# Patient Record
Sex: MALE | Race: WHITE | NOT HISPANIC OR LATINO | Employment: UNEMPLOYED | URBAN - METROPOLITAN AREA
[De-identification: names, ages, dates, MRNs, and addresses within clinical notes are randomized per-mention and may not be internally consistent; named-entity substitution may affect disease eponyms.]

---

## 2019-01-01 ENCOUNTER — HOSPITAL ENCOUNTER (INPATIENT)
Facility: HOSPITAL | Age: 59
LOS: 1 days | DRG: 871 | End: 2019-11-14
Attending: EMERGENCY MEDICINE | Admitting: EMERGENCY MEDICINE
Payer: MEDICAID

## 2019-01-01 VITALS
OXYGEN SATURATION: 91 % | WEIGHT: 138.88 LBS | BODY MASS INDEX: 19.88 KG/M2 | TEMPERATURE: 98 F | HEIGHT: 70 IN | SYSTOLIC BLOOD PRESSURE: 116 MMHG | DIASTOLIC BLOOD PRESSURE: 80 MMHG

## 2019-01-01 DIAGNOSIS — Z46.59 ENCOUNTER FOR OROGASTRIC (OG) TUBE PLACEMENT: ICD-10-CM

## 2019-01-01 DIAGNOSIS — A41.9 SEPSIS: ICD-10-CM

## 2019-01-01 DIAGNOSIS — Z51.5 PALLIATIVE CARE ENCOUNTER: ICD-10-CM

## 2019-01-01 LAB
ALBUMIN SERPL BCP-MCNC: 1.7 G/DL (ref 3.5–5.2)
ALBUMIN SERPL BCP-MCNC: 1.8 G/DL (ref 3.5–5.2)
ALBUMIN SERPL BCP-MCNC: 1.8 G/DL (ref 3.5–5.2)
ALLENS TEST: ABNORMAL
ALLENS TEST: ABNORMAL
ALP SERPL-CCNC: 190 U/L (ref 55–135)
ALP SERPL-CCNC: 205 U/L (ref 55–135)
ALP SERPL-CCNC: 438 U/L (ref 55–135)
ALT SERPL W/O P-5'-P-CCNC: 1466 U/L (ref 10–44)
ALT SERPL W/O P-5'-P-CCNC: 498 U/L (ref 10–44)
ALT SERPL W/O P-5'-P-CCNC: 737 U/L (ref 10–44)
ANION GAP SERPL CALC-SCNC: 28 MMOL/L (ref 8–16)
ANION GAP SERPL CALC-SCNC: 31 MMOL/L (ref 8–16)
ANION GAP SERPL CALC-SCNC: 32 MMOL/L (ref 8–16)
ANISOCYTOSIS BLD QL SMEAR: SLIGHT
ANISOCYTOSIS BLD QL SMEAR: SLIGHT
APTT BLDCRRT: 89.2 SEC (ref 21–32)
AST SERPL-CCNC: 2509 U/L (ref 10–40)
AST SERPL-CCNC: 3641 U/L (ref 10–40)
AST SERPL-CCNC: 9923 U/L (ref 10–40)
BACTERIA #/AREA URNS AUTO: ABNORMAL /HPF
BASO STIPL BLD QL SMEAR: ABNORMAL
BASOPHILS # BLD AUTO: ABNORMAL K/UL (ref 0–0.2)
BASOPHILS # BLD AUTO: ABNORMAL K/UL (ref 0–0.2)
BASOPHILS NFR BLD: 0 % (ref 0–1.9)
BILIRUB SERPL-MCNC: 2.7 MG/DL (ref 0.1–1)
BILIRUB SERPL-MCNC: 3 MG/DL (ref 0.1–1)
BILIRUB SERPL-MCNC: 4.1 MG/DL (ref 0.1–1)
BILIRUB UR QL STRIP: NEGATIVE
BUN SERPL-MCNC: 64 MG/DL (ref 6–20)
BUN SERPL-MCNC: 64 MG/DL (ref 6–30)
BUN SERPL-MCNC: 67 MG/DL (ref 6–20)
BUN SERPL-MCNC: 69 MG/DL (ref 6–20)
BURR CELLS BLD QL SMEAR: ABNORMAL
BURR CELLS BLD QL SMEAR: ABNORMAL
CALCIUM SERPL-MCNC: 10.1 MG/DL (ref 8.7–10.5)
CALCIUM SERPL-MCNC: 10.2 MG/DL (ref 8.7–10.5)
CALCIUM SERPL-MCNC: 9.1 MG/DL (ref 8.7–10.5)
CAOX CRY UR QL COMP ASSIST: ABNORMAL
CHLORIDE SERPL-SCNC: 102 MMOL/L (ref 95–110)
CHLORIDE SERPL-SCNC: 91 MMOL/L (ref 95–110)
CHLORIDE SERPL-SCNC: 95 MMOL/L (ref 95–110)
CHLORIDE SERPL-SCNC: 97 MMOL/L (ref 95–110)
CLARITY UR REFRACT.AUTO: ABNORMAL
CO2 SERPL-SCNC: 12 MMOL/L (ref 23–29)
CO2 SERPL-SCNC: 7 MMOL/L (ref 23–29)
CO2 SERPL-SCNC: 8 MMOL/L (ref 23–29)
COLOR UR AUTO: ABNORMAL
CREAT SERPL-MCNC: 3.2 MG/DL (ref 0.5–1.4)
CREAT SERPL-MCNC: 3.2 MG/DL (ref 0.5–1.4)
CREAT SERPL-MCNC: 3.9 MG/DL (ref 0.5–1.4)
CREAT SERPL-MCNC: ABNORMAL MG/DL
D DIMER PPP IA.FEU-MCNC: >33 MG/L FEU
DELSYS: ABNORMAL
DELSYS: ABNORMAL
DIFFERENTIAL METHOD: ABNORMAL
EOSINOPHIL # BLD AUTO: ABNORMAL K/UL (ref 0–0.5)
EOSINOPHIL # BLD AUTO: ABNORMAL K/UL (ref 0–0.5)
EOSINOPHIL NFR BLD: 0 % (ref 0–8)
EOSINOPHIL NFR BLD: 3 % (ref 0–8)
EOSINOPHIL NFR BLD: 3 % (ref 0–8)
ERYTHROCYTE [DISTWIDTH] IN BLOOD BY AUTOMATED COUNT: 14.5 % (ref 11.5–14.5)
ERYTHROCYTE [DISTWIDTH] IN BLOOD BY AUTOMATED COUNT: 14.6 % (ref 11.5–14.5)
ERYTHROCYTE [DISTWIDTH] IN BLOOD BY AUTOMATED COUNT: 14.7 % (ref 11.5–14.5)
ERYTHROCYTE [SEDIMENTATION RATE] IN BLOOD BY WESTERGREN METHOD: 16 MM/H
EST. GFR  (AFRICAN AMERICAN): 18.4 ML/MIN/1.73 M^2
EST. GFR  (AFRICAN AMERICAN): 23.4 ML/MIN/1.73 M^2
EST. GFR  (AFRICAN AMERICAN): 23.4 ML/MIN/1.73 M^2
EST. GFR  (NON AFRICAN AMERICAN): 15.9 ML/MIN/1.73 M^2
EST. GFR  (NON AFRICAN AMERICAN): 20.2 ML/MIN/1.73 M^2
EST. GFR  (NON AFRICAN AMERICAN): 20.2 ML/MIN/1.73 M^2
FIBRINOGEN PPP-MCNC: <70 MG/DL (ref 182–366)
FIO2: 60
FIO2: 60
GLUCOSE SERPL-MCNC: 204 MG/DL (ref 70–110)
GLUCOSE SERPL-MCNC: 223 MG/DL (ref 70–110)
GLUCOSE SERPL-MCNC: 77 MG/DL (ref 70–110)
GLUCOSE SERPL-MCNC: 82 MG/DL (ref 70–110)
GLUCOSE UR QL STRIP: NEGATIVE
HAPTOGLOB SERPL-MCNC: 56 MG/DL (ref 30–250)
HCO3 UR-SCNC: 13.5 MMOL/L (ref 24–28)
HCO3 UR-SCNC: 9 MMOL/L (ref 24–28)
HCT VFR BLD AUTO: 39.2 % (ref 40–54)
HCT VFR BLD AUTO: 41.1 % (ref 40–54)
HCT VFR BLD AUTO: 41.4 % (ref 40–54)
HCT VFR BLD CALC: 39 %PCV (ref 36–54)
HGB BLD-MCNC: 12.4 G/DL (ref 14–18)
HGB BLD-MCNC: 12.5 G/DL (ref 14–18)
HGB BLD-MCNC: 12.5 G/DL (ref 14–18)
HGB UR QL STRIP: ABNORMAL
HYALINE CASTS UR QL AUTO: 29 /LPF
IMM GRANULOCYTES # BLD AUTO: ABNORMAL K/UL (ref 0–0.04)
IMM GRANULOCYTES NFR BLD AUTO: ABNORMAL % (ref 0–0.5)
INR PPP: 3.2 (ref 0.8–1.2)
KETONES UR QL STRIP: NEGATIVE
LACTATE SERPL-SCNC: >12 MMOL/L (ref 0.5–2.2)
LACTATE SERPL-SCNC: >12 MMOL/L (ref 0.5–2.2)
LDH SERPL L TO P-CCNC: 6388 U/L (ref 110–260)
LEUKOCYTE ESTERASE UR QL STRIP: NEGATIVE
LYMPHOCYTES # BLD AUTO: ABNORMAL K/UL (ref 1–4.8)
LYMPHOCYTES # BLD AUTO: ABNORMAL K/UL (ref 1–4.8)
LYMPHOCYTES NFR BLD: 2.5 % (ref 18–48)
LYMPHOCYTES NFR BLD: 3 % (ref 18–48)
LYMPHOCYTES NFR BLD: 4.5 % (ref 18–48)
MAGNESIUM SERPL-MCNC: 3.6 MG/DL (ref 1.6–2.6)
MAGNESIUM SERPL-MCNC: 3.7 MG/DL (ref 1.6–2.6)
MCH RBC QN AUTO: 32.1 PG (ref 27–31)
MCH RBC QN AUTO: 32.4 PG (ref 27–31)
MCH RBC QN AUTO: 33.1 PG (ref 27–31)
MCHC RBC AUTO-ENTMCNC: 30.2 G/DL (ref 32–36)
MCHC RBC AUTO-ENTMCNC: 30.2 G/DL (ref 32–36)
MCHC RBC AUTO-ENTMCNC: 31.9 G/DL (ref 32–36)
MCV RBC AUTO: 104 FL (ref 82–98)
MCV RBC AUTO: 106 FL (ref 82–98)
MCV RBC AUTO: 107 FL (ref 82–98)
METAMYELOCYTES NFR BLD MANUAL: 1.5 %
METAMYELOCYTES NFR BLD MANUAL: 4.5 %
MICROSCOPIC COMMENT: ABNORMAL
MIN VOL: 19
MIN VOL: 8.87
MODE: ABNORMAL
MODE: ABNORMAL
MONOCYTES # BLD AUTO: ABNORMAL K/UL (ref 0.3–1)
MONOCYTES # BLD AUTO: ABNORMAL K/UL (ref 0.3–1)
MONOCYTES NFR BLD: 1 % (ref 4–15)
MONOCYTES NFR BLD: 11 % (ref 4–15)
MONOCYTES NFR BLD: 6 % (ref 4–15)
MYELOCYTES NFR BLD MANUAL: 0.5 %
MYELOCYTES NFR BLD MANUAL: 1.5 %
MYELOCYTES NFR BLD MANUAL: 2 %
NEUTROPHILS NFR BLD: 70 % (ref 38–73)
NEUTROPHILS NFR BLD: 78 % (ref 38–73)
NEUTROPHILS NFR BLD: 94 % (ref 38–73)
NEUTS BAND NFR BLD MANUAL: 6.5 %
NEUTS BAND NFR BLD MANUAL: 7.5 %
NITRITE UR QL STRIP: NEGATIVE
NRBC BLD-RTO: 0 /100 WBC
NRBC BLD-RTO: 0 /100 WBC
NRBC BLD-RTO: 2 /100 WBC
PCO2 BLDA: 31 MMHG (ref 35–45)
PCO2 BLDA: 42.7 MMHG (ref 35–45)
PEEP: 5
PEEP: 5
PH SMN: 6.93 [PH] (ref 7.35–7.45)
PH SMN: 7.25 [PH] (ref 7.35–7.45)
PH UR STRIP: 5 [PH] (ref 5–8)
PHOSPHATE SERPL-MCNC: 14.8 MG/DL (ref 2.7–4.5)
PIP: 21
PLATELET # BLD AUTO: 129 K/UL (ref 150–350)
PLATELET # BLD AUTO: 145 K/UL (ref 150–350)
PLATELET # BLD AUTO: 87 K/UL (ref 150–350)
PLATELET BLD QL SMEAR: ABNORMAL
PMV BLD AUTO: 10.1 FL (ref 9.2–12.9)
PMV BLD AUTO: 10.3 FL (ref 9.2–12.9)
PMV BLD AUTO: 9.8 FL (ref 9.2–12.9)
PO2 BLDA: 105 MMHG (ref 80–100)
PO2 BLDA: 67 MMHG (ref 80–100)
POC BE: -14 MMOL/L
POC BE: -23 MMOL/L
POC IONIZED CALCIUM: 1.13 MMOL/L (ref 1.06–1.42)
POC SATURATED O2: 90 % (ref 95–100)
POC SATURATED O2: 93 % (ref 95–100)
POC TCO2 (MEASURED): 12 MMOL/L (ref 23–29)
POC TCO2: 10 MMOL/L (ref 23–27)
POC TCO2: 14 MMOL/L (ref 23–27)
POCT GLUCOSE: 229 MG/DL (ref 70–110)
POCT GLUCOSE: 244 MG/DL (ref 70–110)
POCT GLUCOSE: 245 MG/DL (ref 70–110)
POCT GLUCOSE: 260 MG/DL (ref 70–110)
POCT GLUCOSE: 263 MG/DL (ref 70–110)
POCT GLUCOSE: 85 MG/DL (ref 70–110)
POIKILOCYTOSIS BLD QL SMEAR: ABNORMAL
POIKILOCYTOSIS BLD QL SMEAR: SLIGHT
POLYCHROMASIA BLD QL SMEAR: ABNORMAL
POLYCHROMASIA BLD QL SMEAR: ABNORMAL
POTASSIUM BLD-SCNC: 6.3 MMOL/L (ref 3.5–5.1)
POTASSIUM SERPL-SCNC: 6.6 MMOL/L (ref 3.5–5.1)
POTASSIUM SERPL-SCNC: 6.6 MMOL/L (ref 3.5–5.1)
POTASSIUM SERPL-SCNC: 8.1 MMOL/L (ref 3.5–5.1)
PROT SERPL-MCNC: 5.4 G/DL (ref 6–8.4)
PROT SERPL-MCNC: 5.5 G/DL (ref 6–8.4)
PROT SERPL-MCNC: 5.7 G/DL (ref 6–8.4)
PROT UR QL STRIP: ABNORMAL
PROTHROMBIN TIME: 31.1 SEC (ref 9–12.5)
PS: 10
RBC # BLD AUTO: 3.78 M/UL (ref 4.6–6.2)
RBC # BLD AUTO: 3.83 M/UL (ref 4.6–6.2)
RBC # BLD AUTO: 3.89 M/UL (ref 4.6–6.2)
RBC #/AREA URNS AUTO: 15 /HPF (ref 0–4)
SAMPLE: ABNORMAL
SITE: ABNORMAL
SITE: ABNORMAL
SODIUM BLD-SCNC: 133 MMOL/L (ref 136–145)
SODIUM SERPL-SCNC: 131 MMOL/L (ref 136–145)
SODIUM SERPL-SCNC: 133 MMOL/L (ref 136–145)
SODIUM SERPL-SCNC: 137 MMOL/L (ref 136–145)
SP GR UR STRIP: 1.01 (ref 1–1.03)
SPONT RATE: 29
SQUAMOUS #/AREA URNS AUTO: 1 /HPF
TOXIC GRANULES BLD QL SMEAR: PRESENT
URN SPEC COLLECT METH UR: ABNORMAL
VT: 450
WBC # BLD AUTO: 26.61 K/UL (ref 3.9–12.7)
WBC # BLD AUTO: 42.46 K/UL (ref 3.9–12.7)
WBC # BLD AUTO: 44.9 K/UL (ref 3.9–12.7)
WBC #/AREA URNS AUTO: 4 /HPF (ref 0–5)
WBC TOXIC VACUOLES BLD QL SMEAR: PRESENT

## 2019-01-01 PROCEDURE — 83735 ASSAY OF MAGNESIUM: CPT

## 2019-01-01 PROCEDURE — 63600175 PHARM REV CODE 636 W HCPCS: Performed by: STUDENT IN AN ORGANIZED HEALTH CARE EDUCATION/TRAINING PROGRAM

## 2019-01-01 PROCEDURE — 82803 BLOOD GASES ANY COMBINATION: CPT

## 2019-01-01 PROCEDURE — 63600175 PHARM REV CODE 636 W HCPCS: Performed by: INTERNAL MEDICINE

## 2019-01-01 PROCEDURE — 99291 CRITICAL CARE FIRST HOUR: CPT | Mod: 25

## 2019-01-01 PROCEDURE — 99291 CRITICAL CARE FIRST HOUR: CPT | Mod: ,,, | Performed by: EMERGENCY MEDICINE

## 2019-01-01 PROCEDURE — 99291 PR CRITICAL CARE, E/M 30-74 MINUTES: ICD-10-PCS | Mod: ,,, | Performed by: INTERNAL MEDICINE

## 2019-01-01 PROCEDURE — 99291 CRITICAL CARE FIRST HOUR: CPT | Mod: ,,, | Performed by: INTERNAL MEDICINE

## 2019-01-01 PROCEDURE — S0028 INJECTION, FAMOTIDINE, 20 MG: HCPCS | Performed by: INTERNAL MEDICINE

## 2019-01-01 PROCEDURE — 99900035 HC TECH TIME PER 15 MIN (STAT)

## 2019-01-01 PROCEDURE — C1751 CATH, INF, PER/CENT/MIDLINE: HCPCS

## 2019-01-01 PROCEDURE — 87040 BLOOD CULTURE FOR BACTERIA: CPT

## 2019-01-01 PROCEDURE — 83605 ASSAY OF LACTIC ACID: CPT

## 2019-01-01 PROCEDURE — 99233 PR SUBSEQUENT HOSPITAL CARE,LEVL III: ICD-10-PCS | Mod: ,,, | Performed by: INTERNAL MEDICINE

## 2019-01-01 PROCEDURE — 96365 THER/PROPH/DIAG IV INF INIT: CPT

## 2019-01-01 PROCEDURE — 99291 PR CRITICAL CARE, E/M 30-74 MINUTES: ICD-10-PCS | Mod: ,,, | Performed by: EMERGENCY MEDICINE

## 2019-01-01 PROCEDURE — 84100 ASSAY OF PHOSPHORUS: CPT

## 2019-01-01 PROCEDURE — 99233 SBSQ HOSP IP/OBS HIGH 50: CPT | Mod: ,,, | Performed by: INTERNAL MEDICINE

## 2019-01-01 PROCEDURE — 94002 VENT MGMT INPAT INIT DAY: CPT

## 2019-01-01 PROCEDURE — 85027 COMPLETE CBC AUTOMATED: CPT | Mod: 91

## 2019-01-01 PROCEDURE — 85384 FIBRINOGEN ACTIVITY: CPT

## 2019-01-01 PROCEDURE — 94640 AIRWAY INHALATION TREATMENT: CPT

## 2019-01-01 PROCEDURE — 36600 WITHDRAWAL OF ARTERIAL BLOOD: CPT

## 2019-01-01 PROCEDURE — 80053 COMPREHEN METABOLIC PANEL: CPT | Mod: 91

## 2019-01-01 PROCEDURE — 25000003 PHARM REV CODE 250: Performed by: INTERNAL MEDICINE

## 2019-01-01 PROCEDURE — 85379 FIBRIN DEGRADATION QUANT: CPT

## 2019-01-01 PROCEDURE — 27000221 HC OXYGEN, UP TO 24 HOURS

## 2019-01-01 PROCEDURE — 51702 INSERT TEMP BLADDER CATH: CPT

## 2019-01-01 PROCEDURE — 81001 URINALYSIS AUTO W/SCOPE: CPT

## 2019-01-01 PROCEDURE — 25000003 PHARM REV CODE 250: Performed by: STUDENT IN AN ORGANIZED HEALTH CARE EDUCATION/TRAINING PROGRAM

## 2019-01-01 PROCEDURE — 94003 VENT MGMT INPAT SUBQ DAY: CPT

## 2019-01-01 PROCEDURE — 99900026 HC AIRWAY MAINTENANCE (STAT)

## 2019-01-01 PROCEDURE — 85007 BL SMEAR W/DIFF WBC COUNT: CPT | Mod: 91

## 2019-01-01 PROCEDURE — 43752 NASAL/OROGASTRIC W/TUBE PLMT: CPT

## 2019-01-01 PROCEDURE — 82962 GLUCOSE BLOOD TEST: CPT

## 2019-01-01 PROCEDURE — 85730 THROMBOPLASTIN TIME PARTIAL: CPT

## 2019-01-01 PROCEDURE — 85610 PROTHROMBIN TIME: CPT

## 2019-01-01 PROCEDURE — 25000242 PHARM REV CODE 250 ALT 637 W/ HCPCS: Performed by: EMERGENCY MEDICINE

## 2019-01-01 PROCEDURE — 80053 COMPREHEN METABOLIC PANEL: CPT

## 2019-01-01 PROCEDURE — 31500 INSERT EMERGENCY AIRWAY: CPT

## 2019-01-01 PROCEDURE — 20000000 HC ICU ROOM

## 2019-01-01 PROCEDURE — 25000003 PHARM REV CODE 250: Performed by: EMERGENCY MEDICINE

## 2019-01-01 PROCEDURE — 94761 N-INVAS EAR/PLS OXIMETRY MLT: CPT

## 2019-01-01 PROCEDURE — 36620 INSERTION CATHETER ARTERY: CPT

## 2019-01-01 PROCEDURE — 36556 INSERT NON-TUNNEL CV CATH: CPT

## 2019-01-01 PROCEDURE — 83010 ASSAY OF HAPTOGLOBIN QUANT: CPT

## 2019-01-01 PROCEDURE — 96375 TX/PRO/DX INJ NEW DRUG ADDON: CPT

## 2019-01-01 PROCEDURE — 83605 ASSAY OF LACTIC ACID: CPT | Mod: 91

## 2019-01-01 PROCEDURE — 63600175 PHARM REV CODE 636 W HCPCS: Performed by: EMERGENCY MEDICINE

## 2019-01-01 PROCEDURE — 96368 THER/DIAG CONCURRENT INF: CPT

## 2019-01-01 PROCEDURE — 25000003 PHARM REV CODE 250

## 2019-01-01 PROCEDURE — 37799 UNLISTED PX VASCULAR SURGERY: CPT

## 2019-01-01 PROCEDURE — 83735 ASSAY OF MAGNESIUM: CPT | Mod: 91

## 2019-01-01 PROCEDURE — 83615 LACTATE (LD) (LDH) ENZYME: CPT

## 2019-01-01 RX ORDER — MORPHINE SULFATE 2 MG/ML
2 INJECTION, SOLUTION INTRAMUSCULAR; INTRAVENOUS
Status: DISCONTINUED | OUTPATIENT
Start: 2019-01-01 | End: 2019-01-01 | Stop reason: HOSPADM

## 2019-01-01 RX ORDER — DEXTROSE MONOHYDRATE 100 MG/ML
1000 INJECTION, SOLUTION INTRAVENOUS
Status: COMPLETED | OUTPATIENT
Start: 2019-01-01 | End: 2019-01-01

## 2019-01-01 RX ORDER — INSULIN ASPART 100 [IU]/ML
0-5 INJECTION, SOLUTION INTRAVENOUS; SUBCUTANEOUS EVERY 6 HOURS PRN
Status: DISCONTINUED | OUTPATIENT
Start: 2019-01-01 | End: 2019-01-01 | Stop reason: HOSPADM

## 2019-01-01 RX ORDER — CALCIUM CHLORIDE IN 0.9 % NACL 1 G/100 ML
1 INTRAVENOUS SOLUTION, PIGGYBACK (ML) INTRAVENOUS ONCE
Status: COMPLETED | OUTPATIENT
Start: 2019-01-01 | End: 2019-01-01

## 2019-01-01 RX ORDER — VANCOMYCIN HCL IN 5 % DEXTROSE 1G/250ML
1000 PLASTIC BAG, INJECTION (ML) INTRAVENOUS ONCE
Status: COMPLETED | OUTPATIENT
Start: 2019-01-01 | End: 2019-01-01

## 2019-01-01 RX ORDER — FENTANYL CITRATE-0.9 % NACL/PF 10 MCG/ML
PLASTIC BAG, INJECTION (ML) INTRAVENOUS CONTINUOUS
Status: DISCONTINUED | OUTPATIENT
Start: 2019-01-01 | End: 2019-01-01 | Stop reason: HOSPADM

## 2019-01-01 RX ORDER — GLUCAGON 1 MG
1 KIT INJECTION
Status: DISCONTINUED | OUTPATIENT
Start: 2019-01-01 | End: 2019-01-01 | Stop reason: HOSPADM

## 2019-01-01 RX ORDER — DEXMEDETOMIDINE HYDROCHLORIDE 4 UG/ML
0.2 INJECTION, SOLUTION INTRAVENOUS CONTINUOUS
Status: DISCONTINUED | OUTPATIENT
Start: 2019-01-01 | End: 2019-01-01 | Stop reason: HOSPADM

## 2019-01-01 RX ORDER — SODIUM CHLORIDE 0.9 % (FLUSH) 0.9 %
10 SYRINGE (ML) INJECTION
Status: DISCONTINUED | OUTPATIENT
Start: 2019-01-01 | End: 2019-01-01 | Stop reason: HOSPADM

## 2019-01-01 RX ORDER — CALCIUM CHLORIDE INJECTION 100 MG/ML
1 INJECTION, SOLUTION INTRAVENOUS
Status: DISCONTINUED | OUTPATIENT
Start: 2019-01-01 | End: 2019-01-01

## 2019-01-01 RX ORDER — FENTANYL CITRATE 50 UG/ML
25 INJECTION, SOLUTION INTRAMUSCULAR; INTRAVENOUS
Status: DISCONTINUED | OUTPATIENT
Start: 2019-01-01 | End: 2019-01-01 | Stop reason: HOSPADM

## 2019-01-01 RX ORDER — FAMOTIDINE 10 MG/ML
20 INJECTION INTRAVENOUS 2 TIMES DAILY
Status: DISCONTINUED | OUTPATIENT
Start: 2019-01-01 | End: 2019-01-01

## 2019-01-01 RX ORDER — ALBUTEROL SULFATE 2.5 MG/.5ML
10 SOLUTION RESPIRATORY (INHALATION)
Status: COMPLETED | OUTPATIENT
Start: 2019-01-01 | End: 2019-01-01

## 2019-01-01 RX ORDER — CHLORHEXIDINE GLUCONATE ORAL RINSE 1.2 MG/ML
15 SOLUTION DENTAL 2 TIMES DAILY
Status: DISCONTINUED | OUTPATIENT
Start: 2019-01-01 | End: 2019-01-01 | Stop reason: HOSPADM

## 2019-01-01 RX ORDER — NOREPINEPHRINE BITARTRATE/D5W 4MG/250ML
PLASTIC BAG, INJECTION (ML) INTRAVENOUS
Status: COMPLETED
Start: 2019-01-01 | End: 2019-01-01

## 2019-01-01 RX ORDER — NOREPINEPHRINE BITARTRATE/D5W 4MG/250ML
0.05 PLASTIC BAG, INJECTION (ML) INTRAVENOUS CONTINUOUS
Status: DISCONTINUED | OUTPATIENT
Start: 2019-01-01 | End: 2019-01-01

## 2019-01-01 RX ORDER — CALCIUM CHLORIDE INJECTION 100 MG/ML
1 INJECTION, SOLUTION INTRAVENOUS
Status: COMPLETED | OUTPATIENT
Start: 2019-01-01 | End: 2019-01-01

## 2019-01-01 RX ORDER — FAMOTIDINE 10 MG/ML
20 INJECTION INTRAVENOUS DAILY
Status: DISCONTINUED | OUTPATIENT
Start: 2019-01-01 | End: 2019-01-01 | Stop reason: HOSPADM

## 2019-01-01 RX ORDER — DEXMEDETOMIDINE HYDROCHLORIDE 4 UG/ML
INJECTION, SOLUTION INTRAVENOUS
Status: COMPLETED
Start: 2019-01-01 | End: 2019-01-01

## 2019-01-01 RX ORDER — LORAZEPAM 2 MG/ML
1 INJECTION INTRAMUSCULAR
Status: DISCONTINUED | OUTPATIENT
Start: 2019-01-01 | End: 2019-01-01 | Stop reason: HOSPADM

## 2019-01-01 RX ADMIN — MORPHINE SULFATE 2 MG: 2 INJECTION, SOLUTION INTRAMUSCULAR; INTRAVENOUS at 12:11

## 2019-01-01 RX ADMIN — LORAZEPAM 1 MG: 2 INJECTION INTRAMUSCULAR; INTRAVENOUS at 12:11

## 2019-01-01 RX ADMIN — PIPERACILLIN AND TAZOBACTAM 4.5 G: 4; .5 INJECTION, POWDER, FOR SOLUTION INTRAVENOUS at 08:11

## 2019-01-01 RX ADMIN — CALCIUM CHLORIDE 1 G: 100 INJECTION, SOLUTION INTRAVENOUS at 01:11

## 2019-01-01 RX ADMIN — DEXTROSE 1000 ML: 10 SOLUTION INTRAVENOUS at 03:11

## 2019-01-01 RX ADMIN — INSULIN ASPART 3 UNITS: 100 INJECTION, SOLUTION INTRAVENOUS; SUBCUTANEOUS at 06:11

## 2019-01-01 RX ADMIN — INSULIN HUMAN 10 UNITS: 100 INJECTION, SOLUTION PARENTERAL at 03:11

## 2019-01-01 RX ADMIN — FAMOTIDINE 20 MG: 10 INJECTION, SOLUTION INTRAVENOUS at 08:11

## 2019-01-01 RX ADMIN — CHLORHEXIDINE GLUCONATE 0.12% ORAL RINSE 15 ML: 1.2 LIQUID ORAL at 11:11

## 2019-01-01 RX ADMIN — CALCIUM CHLORIDE 1 G: 100 INJECTION, SOLUTION INTRAVENOUS at 03:11

## 2019-01-01 RX ADMIN — VANCOMYCIN HYDROCHLORIDE 1000 MG: 1 INJECTION, POWDER, LYOPHILIZED, FOR SOLUTION INTRAVENOUS at 08:11

## 2019-01-01 RX ADMIN — VASOPRESSIN 0.03 UNITS/MIN: 20 INJECTION INTRAVENOUS at 01:11

## 2019-01-01 RX ADMIN — Medication 50 MCG/HR: at 08:11

## 2019-01-01 RX ADMIN — FENTANYL CITRATE 25 MCG: 50 INJECTION INTRAMUSCULAR; INTRAVENOUS at 06:11

## 2019-01-01 RX ADMIN — DEXMEDETOMIDINE HYDROCHLORIDE 0.2 MCG/KG/HR: 4 INJECTION, SOLUTION INTRAVENOUS at 03:11

## 2019-01-01 RX ADMIN — Medication 1 MCG/KG/MIN: at 02:11

## 2019-01-01 RX ADMIN — SODIUM BICARBONATE: 84 INJECTION, SOLUTION INTRAVENOUS at 03:11

## 2019-01-01 RX ADMIN — DEXTROSE 250 ML: 10 SOLUTION INTRAVENOUS at 01:11

## 2019-01-01 RX ADMIN — SODIUM BICARBONATE: 84 INJECTION, SOLUTION INTRAVENOUS at 04:11

## 2019-01-01 RX ADMIN — INSULIN HUMAN 10 UNITS: 100 INJECTION, SOLUTION PARENTERAL at 02:11

## 2019-01-01 RX ADMIN — Medication 0.5 MCG/KG/MIN: at 01:11

## 2019-01-01 RX ADMIN — Medication 0.6 MCG/KG/MIN: at 05:11

## 2019-01-01 RX ADMIN — DEXMEDETOMIDINE HYDROCHLORIDE 0.2 MCG/KG/HR: 4 INJECTION, SOLUTION INTRAVENOUS at 01:11

## 2019-01-01 RX ADMIN — NOREPINEPHRINE BITARTRATE 0.4 MCG/KG/MIN: 1 INJECTION, SOLUTION, CONCENTRATE INTRAVENOUS at 12:11

## 2019-01-01 RX ADMIN — ALBUTEROL SULFATE 10 MG: 2.5 SOLUTION RESPIRATORY (INHALATION) at 01:11

## 2019-01-01 RX ADMIN — PIPERACILLIN AND TAZOBACTAM 4.5 G: 4; .5 INJECTION, POWDER, FOR SOLUTION INTRAVENOUS at 04:11

## 2019-01-01 RX ADMIN — INSULIN ASPART 2 UNITS: 100 INJECTION, SOLUTION INTRAVENOUS; SUBCUTANEOUS at 01:11

## 2019-01-01 RX ADMIN — HYDROCORTISONE SODIUM SUCCINATE 100 MG: 100 INJECTION, POWDER, FOR SOLUTION INTRAMUSCULAR; INTRAVENOUS at 09:11

## 2019-01-01 RX ADMIN — HYDROCORTISONE SODIUM SUCCINATE 100 MG: 100 INJECTION, POWDER, FOR SOLUTION INTRAMUSCULAR; INTRAVENOUS at 01:11

## 2019-11-13 PROBLEM — E87.20 METABOLIC ACIDOSIS: Status: ACTIVE | Noted: 2019-01-01

## 2019-11-13 PROBLEM — Z51.5 PALLIATIVE CARE ENCOUNTER: Status: ACTIVE | Noted: 2019-01-01

## 2019-11-13 PROBLEM — D68.9 COAGULOPATHY: Status: ACTIVE | Noted: 2019-01-01

## 2019-11-13 PROBLEM — J96.90 RESPIRATORY FAILURE: Status: ACTIVE | Noted: 2019-01-01

## 2019-11-13 PROBLEM — N17.9 ACUTE RENAL FAILURE: Status: ACTIVE | Noted: 2019-01-01

## 2019-11-13 PROBLEM — A41.9 SEPSIS: Status: ACTIVE | Noted: 2019-01-01

## 2019-11-13 PROBLEM — C22.9 LIVER CANCER: Status: ACTIVE | Noted: 2019-01-01

## 2019-11-13 PROBLEM — R57.9 SHOCK: Status: ACTIVE | Noted: 2019-01-01

## 2019-11-13 NOTE — HPI
Alvin Mujica is a 59 y.o.male transferred from [facility] presenting with septic shock. Patient has a history liver cancer with mets to lung, allegedly came to outside facility complaining of abdominal pain and shortness of breath. Patient transferred here after he was intubated, hypotensive.  Patient found to have leukocytosis in the 40s, renal failure, lactic acidosis with a pH of 6.9, and hyperkalemia.  He was given Rocephin, meropenem, and multiple drugs to shift his potassium,and started on bicarbonate infusion.He was sedated on Precedex, and was requiring norepinephrine and vasopresin infusions.  EMS states he continues to be tachycardic with stable blood pressures, 100% while intubated.Upon arrival pt was hypthothermic with Temp as low as 89F. Outside CT A/P notable for moderate size bilateral pleural effusions with innumerable bilateral pulmonary metastatic nodules. Moderate volume ascites. Cirrhosis with extensive periportal and abdominal retroperitoneal adenopathy and with known hepatocellular carcinoma.

## 2019-11-13 NOTE — PROGRESS NOTES
Pharmacokinetic Initial Assessment: IV Vancomycin    Assessment/Plan:    Initiate IV vancomycin with loading dose of 1000 mg once with subsequent doses when random concentrations are less than 20 mcg/mL.    Draw vancomycin random level with morning labs on 11/14. Desired empiric serum concentration is 10 to 20 mcg/mL.    Pharmacy will continue to follow and monitor vancomycin.      Please contact pharmacy at extension [phone] with any questions regarding this assessment.     Thank you for the consult,   Lala Martinez, PharmD, BCCCP             Patient brief summary:  Alvin Mujica is a 59 y.o. male initiated on antimicrobial therapy with IV vancomycin for treatment of suspected sepsis    Drug Allergies:   Review of patient's allergies indicates:  No Known Allergies    Actual Body Weight:   63 kg     Renal Function:   Estimated Creatinine Clearance: 22.4 mL/min (A) (based on SCr of 3.2 mg/dL (H)).    Dialysis Method (if applicable):  N/A    CBC (last 72 hours):  Recent Labs   Lab Result Units 11/13/19  0114 11/13/19  0346   WBC K/uL 44.90* 42.46*   Hemoglobin g/dL 12.5* 12.4*   Hematocrit % 41.4 41.1   Platelets K/uL 145* 129*   Gran% % 78.0* 70.0   Lymph% % 2.5* 4.5*   Mono% % 6.0 11.0   Eosinophil% % 3.0 3.0   Basophil% % 0.0 0.0   Differential Method  Manual Manual       Metabolic Panel (last 72 hours):  Recent Labs   Lab Result Units 11/13/19  0114 11/13/19  0231 11/13/19  0346   Sodium mmol/L 137  --  133*   Potassium mmol/L 6.6*  --  6.6*   Chloride mmol/L 97  --  95   CO2 mmol/L 8*  --  7*   Glucose mg/dL 77  --  204*   Glucose, UA   --  Negative  --    BUN, Bld mg/dL 69*  --  67*   Creatinine mg/dL 3.2*  --  3.2*   Albumin g/dL 1.8*  --  1.8*   Total Bilirubin mg/dL 2.7*  --  3.0*   Alkaline Phosphatase U/L 190*  --  205*   AST U/L 2,509*  --  3,641*   ALT U/L 498*  --  737*   Magnesium mg/dL 3.7*  --  3.6*   Phosphorus mg/dL  --   --  14.8*       Drug levels (last 3 results):  No results for input(s):  VANCOMYCINRA, VANCOMYCINPE, VANCOMYCINTR in the last 72 hours.    Microbiologic Results:  Microbiology Results (last 7 days)     Procedure Component Value Units Date/Time    Blood culture #2 **CANNOT BE ORDERED STAT** [[id number]] Collected:  11/13/19 0230    Order Status:  Sent Specimen:  Blood from Peripheral, Antecubital, Left Updated:  11/13/19 0241    Blood culture #1 **CANNOT BE ORDERED STAT** [[id number]] Collected:  11/13/19 0230    Order Status:  Sent Specimen:  Blood from Peripheral, Forearm, Right Updated:  11/13/19 0240

## 2019-11-13 NOTE — CONSULTS
[facility]  Palliative Medicine  Consult Note    Patient Name: Alvin Mujica  MRN: [id number]  Admission Date: 11/13/2019  Hospital Length of Stay: 0 days  Code Status: DNR   Attending Provider: Maryellen Pollock MD  Consulting Provider: José Miguel Laureano MD  Primary Care Physician: No primary care provider on file.  Principal Problem:<principal problem not specified>    Patient information was obtained from past medical records and ER records.      Consults  Assessment/Plan:     Palliative care encounter  Palliative Care Encounter / Goals of care discussion:     Narrative:   Alvin Mujica is a 59 y.o. male patient with known HCC, Cirrhosis of the liver and history of Hep C that presents with abdominal pain and weakness. He developed rapidly progressing shock in an outside ED, likely septic, required emergent intubation and vasopressor support. Imaging suggests progression of his known HCC, now with bilateral pulmonary metastatic lesions, pleural effusions and ascites an no clear source of the pt. Septic shock.   He was transferred for higher level of care. He is currently critically ill on maximal level of support.    1: Symptoms:   - pain assesment: not appears to be in pain   - dyspnea assessment: appears tachypnic on vent   - anxiety assessment: not apparent   - depression assessment: unable to assess    2: Code Status:   - DNR    3: Psychosocial :   - Marital status: unclear  - Children: has a son who lives in [place]  - POA: not on file  - Sister lives in [place]    3: Medicolegal:    - Advance directive: not on file   - per Dr. Pollock the son is requesting maximal support until he can get to the hospital. He is on his way    4: Support System:  - Spiritual: unclear  - Family: son and sister, otherwise unknown     5: Goals of care Decisions / Recommendations / Plan:   Palliative care introduced:  [facility] in Disease and Illness trajectory  - unable to assess as pt. On the  vent    Prognostication:  - very poor prognosis given advancing likely metastatic HCC. Pt. Appears to have non adherent to medical follow ups per reports and has not undergone any therapy for HCC.   - he has now developed what appears to be widely metastatic disease, likely malignant ascites and pleural effusions  - he has developed profound multi organ failure and septic shock and remains critically ill despite maximal supportive care with vent and two pressors.   - I agree with continuing maximal supportive care as is to allow family time to arrive  - I also agree that ,should the pt. Die as a result of his underlying liver cancer and severe multi organ failure, CPR and resuscitation efforts are not beneficial care and I support DNR.     Goals of Care:  - will discuss once family arrives    Symptom Management:  - appears to have tachypnea and mild distress.   - once goals are discussed we can improve his comfort if so desired by family     Disposition:  -admitted to ICU    6: Follow up plans:    daily    Thank you for your consult. I will follow along with you. Please call [phone] with questions.                 Thank you for your consult. I will follow-up with patient. Please contact us if you have any additional questions.    Subjective:     HPI:   Alvin Mujica is a unfortunate 58 yo male patient who is transferred to our hospital from [facility] for what appears to be septic shock. The pt. Was intubated prior to transport and no family is currently available for questioning so most information is gathered from the medical chart.   Apparently the pt. Presented to the local ED with generalized weakness and abdominal pain. He is reported to have a medical history of hepatitis C, Cirrhosis of the liver and HCC. He apparently had missed appointments for follow up with his outside doctors in the past but was scheduled to present to Oncology office on day of presentation to the ED.   CT of  the abdomen suggests compared to prior exams, new onset bilateral pleural effusions and multiple lesions throughout both lungs that suggest metastatic disease. Moderate volume ascites which is new to prior with known HCC and perihepatic lymphadenopathy not definitely changed from a prior study. In summary it appears to represent new metastatic lesions in the lungs, bilateral pleural effusions and new onset ascites. No acute intestinal obstruction or other causes for his septic shock were identified. Laboratory findings reveal marked elevation of inflammatory markers, markedly abnormal acid base status suggesting metabolic and respiratory acidosis and renal failure along with hyperkalemia.     I am being asked to evaluate the pt. And determine most beneficial care along with assist with goals of care discussions when family arrives.     Hospital Course:  No notes on file    Interval History: pt. Now intubated, on the vent, on two pressors and broad spectrum ABX. MAP at 65 mmHg    No past medical history on file.    No past surgical history on file.    Review of patient's allergies indicates:  No Known Allergies    Medications:  Continuous Infusions:   dexmedetomidine (PRECEDEX) infusion 0.2 mcg/kg/hr (11/13/19 0308)    norepinephrine bitartrate-D5W      sodium bicarbonate drip 200 mL/hr at 11/13/19 0450    vasopressin (PITRESSIN) infusion 0.04 Units/min (11/13/19 0138)     Scheduled Meds:   chlorhexidine  15 mL Mouth/Throat BID    famotidine (PF)  20 mg Intravenous Daily    hydrocortisone sodium succinate  100 mg Intravenous Q8H    piperacillin-tazobactam (ZOSYN) IVPB  4.5 g Intravenous Q8H    vancomycin (VANCOCIN) IVPB  1,000 mg Intravenous Once     PRN Meds:Dextrose 10% Bolus, glucagon (human recombinant), insulin aspart U-100, sodium chloride 0.9%    Family History     None        Tobacco Use    Smoking status: Not on file   Substance and Sexual Activity    Alcohol use: Not on file    Drug use: Not on  file    Sexual activity: Not on file       Review of Systems   Unable to perform ROS: Acuity of condition     Objective:     Vital Signs (Most Recent):  Temp: 96.8 °F (36 °C) (11/13/19 1002)  Pulse: 104 (11/13/19 1002)  Resp: (!) 23 (11/13/19 1002)  BP: 132/69 (11/13/19 0702)  SpO2: (!) 93 % (11/13/19 1002) Vital Signs (24h Range):  Temp:  [89.8 °F (32.1 °C)-96.8 °F (36 °C)] 96.8 °F (36 °C)  Pulse:  [] 104  Resp:  [17-34] 23  SpO2:  [83 %-100 %] 93 %  BP: ()/(28-95) 132/69  Arterial Line BP: (75-83)/(56-62) 77/58     Weight: 63 kg (138 lb 14.2 oz)  Body mass index is 19.93 kg/m².    Review of Symptoms  Symptom Assessment (ESAS 0-10 scale)   ESAS 0 1 2 3 4 5 6 7 8 9 10   Pain              Dyspnea              Anxiety              Nausea              Depression               Anorexia              Fatigue              Insomnia              Restlessness               Agitation              CAM / Delirium __ --  ___+   Constipation     __ --  ___+   Diarrhea           __ --  ___+  Bowel Management Plan (BMP): No    Comments:     Pain Assessment: none apparent    OME in 24 hours:     Performance Status: 10    ECOG Performance Status Grade: 4 - Completely disabled    Physical Exam   Constitutional: He appears well-developed. He appears distressed.   Tachypnea noted, pt. On the vent   Eyes: Scleral icterus is present.   Neck: No JVD present.   Cardiovascular:   Tachycardia, no murmur   Pulmonary/Chest:   Intubated, coarse breath sounds throughout. Mild wheezing on expiration. Bilateral crackles, muffled breath sounds at the bases.    Abdominal:   Distended, soft, fluid wave present, absent bowl sounds.    Musculoskeletal: He exhibits no edema or deformity.   Neurological:   Not sedated, not responding to painful stimuli.    Skin: He is diaphoretic.       Significant Labs: All pertinent labs within the past 24 hours have been reviewed.  CBC:   Recent Labs   Lab 11/13/19  0346   WBC 42.46*   HGB 12.4*   HCT 41.1    *   *     BMP:  Recent Labs   Lab 11/13/19  0346   *   *   K 6.6*   CL 95   CO2 7*   BUN 67*   CREATININE 3.2*   CALCIUM 10.1   MG 3.6*     LFT:  Lab Results   Component Value Date    AST 3,641 (H) 11/13/2019    ALKPHOS 205 (H) 11/13/2019    BILITOT 3.0 (H) 11/13/2019     Albumin:   Albumin   Date Value Ref Range Status   11/13/2019 1.8 (L) 3.5 - 5.2 g/dL Final     Protein:   Total Protein   Date Value Ref Range Status   11/13/2019 5.7 (L) 6.0 - 8.4 g/dL Final     Lactic acid:   Lab Results   Component Value Date    LACTATE >12.0 (HH) 11/13/2019       Significant Imaging: I have reviewed all pertinent imaging results/findings within the past 24 hours.    Advance Care Planning   Advanced Directives::  Living Will: No  LaPOST: No  Do Not Resuscitate Status: No  Medical Power of : No, Son next of kin, who lives in [place].     Decision-Making Capacity: Patient unable to communicate due to disease severity/cognitive impairment       Living Arrangements: Lives alone    Psychosocial/Cultural:  Patient's most important priorities:  unknown    Patient's biggest concerns/fears:      Previous death/end of life care history:      Patient's goals/hopes:      Spiritual:     F- Seema and Belief: unknown    I - Importance:   .  C - Community:     A - Address in Care:       > 50% of 45 min visit spent in chart review, face to face discussion of goals of care,  symptom assessment, coordination of care and emotional support.    José Miguel Laureano MD  Palliative Medicine  [facility]

## 2019-11-13 NOTE — ASSESSMENT & PLAN NOTE
- due to underlying liver disease vs DIC  - F/u hemolysis labs  - consider vit K for elevated INR

## 2019-11-13 NOTE — ED TRIAGE NOTES
Pt. Presents to ED today with [facility] from [facility] with c/o septic shock and respiratory distress. Pt. Presents intubated with a-line, on levo, vaso, epi, bicarb, and precedex gtt. Initially hypotensive. Responds to painful stimuli.

## 2019-11-13 NOTE — ASSESSMENT & PLAN NOTE
- likely 2/2 ischemic ATN  -acidotic, Cr 3.2, low UOP  -nephro consulted for dialysis; appreciate recs. Poor HD candidate given shock on two pressors

## 2019-11-13 NOTE — PLAN OF CARE
CM met with patient at the bedside to discuss D/C POC needs. Patient intubated and unable to verify demographics in the chart are correct. CM name and contact number listed on the patient's white board.  CM provided explanation of discharge plan process. CM left blue folder at the bedside with explanation of qualification for placement and facility resources. Noted patient's son Alan is en-route from [place] from medical record.  Will complete assessment upon arrival of family.    Extended Emergency Contact Information  Primary Emergency Contact: Alan Mujica  Mobile Phone: [phone]  Relation: Son   needed? No  Secondary Emergency Contact: Pebbles Downing  Mobile Phone: [phone]  Relation: Friend        11/13/19 1342   Discharge Assessment   Assessment Type Discharge Planning Assessment   Assessment information obtained from? Medical Record   Prior to hospitilization cognitive status: Alert/Oriented   Current cognitive status: Coma/Sedated/Intubated   Current Functional Status: Completely Dependent   Facility Arrived From: [facility]   Lives With alone   Able to Return to Prior Arrangements no   Is patient able to care for self after discharge? No   Who are your caregiver(s) and their phone number(s)? Alan armando [phone]   Do you have any problems affording any of your prescribed medications? No   Is the patient taking medications as prescribed? yes   Discharge Plan [facility] Inpatient Hospice   Patient/Family in Agreement with Plan unable to assess       Garrett Swartz RN MSN  Critical Care-   Ext. [phone]

## 2019-11-13 NOTE — PROGRESS NOTES
Patient arrived to 6065 intubated with vasopressors infusing. Art line BP 70s/40s with cuff not correlating with BP 90s/50s. Skin intact but mottled. Outside facility singh in place, yellow discharge noted to tip of penis; right femoral triple lumen in place. Critical care notified of patient arrival.

## 2019-11-13 NOTE — ASSESSMENT & PLAN NOTE
- likely hypoxemic resp failure 2/2 metastatic disease and malignant pleural effusions reported on CT  - Intubated and mechanically ventillated  Vent Mode: A/C  Oxygen Concentration (%):  [60] 60  Resp Rate Total:  [31 br/min-34 br/min] 32 br/min  Vt Set:  [450 mL] 450 mL  PEEP/CPAP:  [5 cmH20] 5 cmH20  Pressure Support:  [0 cmH20] 0 cmH20  Mean Airway Pressure:  [10 mjS69-30 cmH20] 10 cmH20   -ABGs

## 2019-11-13 NOTE — CODE/ RAPID DOCUMENTATION
Rapid Response Nurse Chart Check     Chart check completed, abnormal VS noted. Temperature 90 warming blanket on and hypotensive on levophed.   Bedside RN Tamra contacted, no concerns verbalized at this time, instructed to call [phone] for further concerns or assistance.

## 2019-11-13 NOTE — SUBJECTIVE & OBJECTIVE
Interval History: pt. Now intubated, on the vent, on two pressors and broad spectrum ABX. MAP at 65 mmHg    No past medical history on file.    No past surgical history on file.    Review of patient's allergies indicates:  No Known Allergies    Medications:  Continuous Infusions:   dexmedetomidine (PRECEDEX) infusion 0.2 mcg/kg/hr (11/13/19 0303)    norepinephrine bitartrate-D5W      sodium bicarbonate drip 200 mL/hr at 11/13/19 0450    vasopressin (PITRESSIN) infusion 0.04 Units/min (11/13/19 0138)     Scheduled Meds:   chlorhexidine  15 mL Mouth/Throat BID    famotidine (PF)  20 mg Intravenous Daily    hydrocortisone sodium succinate  100 mg Intravenous Q8H    piperacillin-tazobactam (ZOSYN) IVPB  4.5 g Intravenous Q8H    vancomycin (VANCOCIN) IVPB  1,000 mg Intravenous Once     PRN Meds:Dextrose 10% Bolus, glucagon (human recombinant), insulin aspart U-100, sodium chloride 0.9%    Family History     None        Tobacco Use    Smoking status: Not on file   Substance and Sexual Activity    Alcohol use: Not on file    Drug use: Not on file    Sexual activity: Not on file       Review of Systems   Unable to perform ROS: Acuity of condition     Objective:     Vital Signs (Most Recent):  Temp: 96.8 °F (36 °C) (11/13/19 1002)  Pulse: 104 (11/13/19 1002)  Resp: (!) 23 (11/13/19 1002)  BP: 132/69 (11/13/19 0702)  SpO2: (!) 93 % (11/13/19 1002) Vital Signs (24h Range):  Temp:  [89.8 °F (32.1 °C)-96.8 °F (36 °C)] 96.8 °F (36 °C)  Pulse:  [] 104  Resp:  [17-34] 23  SpO2:  [83 %-100 %] 93 %  BP: ()/(28-95) 132/69  Arterial Line BP: (75-83)/(56-62) 77/58     Weight: 63 kg (138 lb 14.2 oz)  Body mass index is 19.93 kg/m².    Review of Symptoms  Symptom Assessment (ESAS 0-10 scale)   ESAS 0 1 2 3 4 5 6 7 8 9 10   Pain              Dyspnea              Anxiety              Nausea              Depression               Anorexia              Fatigue              Insomnia              Restlessness                Agitation              CAM / Delirium __ --  ___+   Constipation     __ --  ___+   Diarrhea           __ --  ___+  Bowel Management Plan (BMP): No    Comments:     Pain Assessment: none apparent    OME in 24 hours:     Performance Status: 10    ECOG Performance Status Grade: 4 - Completely disabled    Physical Exam   Constitutional: He appears well-developed. He appears distressed.   Tachypnea noted, pt. On the vent   Eyes: Scleral icterus is present.   Neck: No JVD present.   Cardiovascular:   Tachycardia, no murmur   Pulmonary/Chest:   Intubated, coarse breath sounds throughout. Mild wheezing on expiration. Bilateral crackles, muffled breath sounds at the bases.    Abdominal:   Distended, soft, fluid wave present, absent bowl sounds.    Musculoskeletal: He exhibits no edema or deformity.   Neurological:   Not sedated, not responding to painful stimuli.    Skin: He is diaphoretic.       Significant Labs: All pertinent labs within the past 24 hours have been reviewed.  CBC:   Recent Labs   Lab 11/13/19  0346   WBC 42.46*   HGB 12.4*   HCT 41.1   *   *     BMP:  Recent Labs   Lab 11/13/19  0346   *   *   K 6.6*   CL 95   CO2 7*   BUN 67*   CREATININE 3.2*   CALCIUM 10.1   MG 3.6*     LFT:  Lab Results   Component Value Date    AST 3,641 (H) 11/13/2019    ALKPHOS 205 (H) 11/13/2019    BILITOT 3.0 (H) 11/13/2019     Albumin:   Albumin   Date Value Ref Range Status   11/13/2019 1.8 (L) 3.5 - 5.2 g/dL Final     Protein:   Total Protein   Date Value Ref Range Status   11/13/2019 5.7 (L) 6.0 - 8.4 g/dL Final     Lactic acid:   Lab Results   Component Value Date    LACTATE >12.0 (HH) 11/13/2019       Significant Imaging: I have reviewed all pertinent imaging results/findings within the past 24 hours.    Advance Care Planning   Advanced Directives::  Living Will: No  LaPOST: No  Do Not Resuscitate Status: No  Medical Power of : No, Son next of kin, who lives in [place].     Decision-Making  Capacity: Patient unable to communicate due to disease severity/cognitive impairment       Living Arrangements: Lives alone    Psychosocial/Cultural:  Patient's most important priorities:  unknown    Patient's biggest concerns/fears:      Previous death/end of life care history:      Patient's goals/hopes:      Spiritual:     F- Seema and Belief: unknown    I - Importance:   .  C - Community:     A - Address in Care:

## 2019-11-13 NOTE — ED PROVIDER NOTES
Source of History:  EMS    Chief complaint:  Transfer (from [facility] for septic shock. +intubated)      HPI:  Alvin Mujica is a 59 y.o. male presenting with septic shock.  Patient has a history liver cancer with mets to lung, allegedly came to outside facility complaining of shortness of breath.  Patient transferred here after was intubated, hypotensive.  Patient found to have leukocytosis in the 40s, renal failure, marked good acidosis with a pH of 6.9, hyperkalemia.  He was given Rocephin, meropenem, multiple drugs to shift his potassium, bicarbonate, was sedated on Precedex, and was requiring norepinephrine and epinephrine infusions.  EMS states he continues to be tachycardic with stable blood pressures, 100% while intubated.    ROS: As per HPI and below:    No other review of systems available due to patient's status    Review of patient's allergies indicates:  No Known Allergies    No current facility-administered medications on file prior to encounter.      No current outpatient medications on file prior to encounter.       PMH:  As per HPI and below:  No past medical history on file.  No past surgical history on file.    Social History     Socioeconomic History    Marital status: Not on file     Spouse name: Not on file    Number of children: Not on file    Years of education: Not on file    Highest education level: Not on file   Occupational History    Not on file   Social Needs    Financial resource strain: Not on file    Food insecurity:     Worry: Not on file     Inability: Not on file    Transportation needs:     Medical: Not on file     Non-medical: Not on file   Tobacco Use    Smoking status: Not on file   Substance and Sexual Activity    Alcohol use: Not on file    Drug use: Not on file    Sexual activity: Not on file   Lifestyle    Physical activity:     Days per week: Not on file     Minutes per session: Not on file    Stress: Not on file   Relationships    Social  connections:     Talks on phone: Not on file     Gets together: Not on file     Attends Judaism service: Not on file     Active member of club or organization: Not on file     Attends meetings of clubs or organizations: Not on file     Relationship status: Not on file   Other Topics Concern    Not on file   Social History Narrative    Not on file       No family history on file.    Physical Exam:    Vitals:    11/13/19 0105   BP: 130/67   Pulse: 96   Resp: (!) 26     Appearance:  Intubated, sign-out extremities  Skin:  Cyanotic in his extremities, normal skin tone in his core  Eyes: No conjunctival injection.  MIKI  ENT:  ET tube in place   Chest: Clear to auscultation bilaterally.  Good air movement.  No wheezes.  No rhonchi.  Cardiovascular:  Art line right radial artery, central venous line right femoral vein. Tachycardic regular rhythm.  No murmurs. No gallops. No rubs.  Abdomen: Soft.  Not distended.  Nontender.  No guarding.  No rebound. No Masses  Musculoskeletal: Good range of motion all joints.  No deformities.  Neck supple.  No meningismus.  Neurologic:  Sedated, intubated  Mental Status:  Sedated, intubated          Laboratory Studies:  Labs Reviewed   CULTURE, BLOOD   CULTURE, BLOOD   CBC W/ AUTO DIFFERENTIAL   COMPREHENSIVE METABOLIC PANEL   MAGNESIUM   LACTIC ACID, PLASMA   URINALYSIS, REFLEX TO URINE CULTURE   POCT GLUCOSE   ISTAT CHEM8       I decided to obtain the old medical records.  Reviewed and summarized the old medical record and it showed none available  I independently interpreted the CXR:  Bilateral patchy airspace disease  I independently interpreted the EKG:  Normal sinus rhythm, tachycardic, no ST changes.    Imaging Results          X-Ray Chest 1 View (In process)                 Medications Given:  Medications   norepinephrine 4 mg in dextrose 5% 250 mL infusion (premix) (titrating) (0.5 mcg/kg/min × 63 kg Intravenous New Bag 11/13/19 0114)   vasopressin (PITRESSIN) 0.2 Units/mL  in dextrose 5 % 100 mL infusion (0.03 Units/min Intravenous New Bag 11/13/19 0118)   dexmedetomidine (PRECEDEX) 400mcg/100mL 0.9% NaCL infusion (0 mcg/kg/hr × 63 kg Intravenous Paused 11/13/19 0123)   calcium chloride 100 mg/mL (10 %) injection 1 g (has no administration in time range)   insulin regular injection 10 Units (has no administration in time range)   dextrose 10 % infusion (has no administration in time range)   albuterol sulfate nebulizer solution 10 mg (has no administration in time range)       Discussed with: ICU    MDM:    59 y.o. male with severe sepsis shock, hypothermia, hypotension, tachycardia.  Unknown source, however patient is severely acidotic, and broad-spectrum antibiotics were given at outside facility.  Patient is currently on Precedex, no epinephrine, vasopressin.  ICU contacted on arrival in [facility] to see patient.  Repeat labs here show severe leukocytosis, hyperkalemia which was treated with calcium chloride, glucose, insulin, albuterol, also severe lactic acidosis and a pH of 6.9.  Patient remains intubated, admitted to ICU.    Critical Care Time    Critical care time was provided for 60 minutes exclusive of other billable procedures and teaching time for the support of metabolic organ system where the potential for death, shock, or further decline was possible. Critical care time can include documentation, discussion with consultants, developing a care plan, as well as direct patient care.      Diagnostic Impression:    1. Sepsis               Alejandro Aguayo MD  11/13/19 0227

## 2019-11-13 NOTE — HPI
Alvin Mujica is a unfortunate 58 yo male patient who is transferred to our hospital from [facility] for what appears to be septic shock. The pt. Was intubated prior to transport and no family is currently available for questioning so most information is gathered from the medical chart.   Apparently the pt. Presented to the local ED with generalized weakness and abdominal pain. He is reported to have a medical history of hepatitis C, Cirrhosis of the liver and HCC. He apparently had missed appointments for follow up with his outside doctors in the past but was scheduled to present to Oncology office on day of presentation to the ED.   CT of the abdomen suggests compared to prior exams, new onset bilateral pleural effusions and multiple lesions throughout both lungs that suggest metastatic disease. Moderate volume ascites which is new to prior with known HCC and perihepatic lymphadenopathy not definitely changed from a prior study. In summary it appears to represent new metastatic lesions in the lungs, bilateral pleural effusions and new onset ascites. No acute intestinal obstruction or other causes for his septic shock were identified. Laboratory findings reveal marked elevation of inflammatory markers, markedly abnormal acid base status suggesting metabolic and respiratory acidosis and renal failure along with hyperkalemia.     I am being asked to evaluate the pt. And determine most beneficial care along with assist with goals of care discussions when family arrives.

## 2019-11-13 NOTE — H&P
[facility]  Critical Care Medicine  History & Physical    Patient Name: Alvin Mujica  MRN: [id number]  Admission Date: 11/13/2019  Hospital Length of Stay: 0 days  Code Status: Full Code  Attending Physician: Maryellen Pollock MD   Primary Care Provider: No primary care provider on file.   Principal Problem: <principal problem not specified>    Subjective:     HPI:  Alvin Mujica is a 59 y.o.male  transferred from [facility] presenting with septic shock. Patient has a history liver cancer with mets to lung, allegedly came to outside facility complaining of abdominal pain and shortness of breath. Patient transferred here after he was intubated, hypotensive.  Patient found to have leukocytosis in the 40s, renal failure, lactic acidosis with a pH of 6.9, and hyperkalemia.  He was given Rocephin, meropenem, and multiple drugs to shift his potassium,and started on bicarbonate infusion.He was sedated on Precedex, and was requiring norepinephrine and vasopresin infusions.  EMS states he continues to be tachycardic with stable blood pressures, 100% while intubated.Upon arrival pt was hypthothermic with Temp as low as 89F. Outside CT A/P notable for moderate size bilateral pleural effusions with innumerable bilateral pulmonary metastatic nodules. Moderate volume ascites. Cirrhosis with extensive periportal and abdominal retroperitoneal adenopathy and with known hepatocellular carcinoma.          Hospital/ICU Course:  No notes on file     No past medical history on file.    No past surgical history on file.    Review of patient's allergies indicates:  No Known Allergies    Family History     None        Tobacco Use    Smoking status: Not on file   Substance and Sexual Activity    Alcohol use: Not on file    Drug use: Not on file    Sexual activity: Not on file      Review of Systems   Unable to perform ROS: Intubated     Objective:     Vital Signs (Most Recent):  Temp: (!) 92.1 °F (33.4  °C) (11/13/19 0607)  Pulse: 109 (11/13/19 0607)  Resp: (!) 31 (11/13/19 0603)  BP: 128/62 (11/13/19 0603)  SpO2: 95 % (11/13/19 0603) Vital Signs (24h Range):  Temp:  [89.8 °F (32.1 °C)-92.1 °F (33.4 °C)] 92.1 °F (33.4 °C)  Pulse:  [] 109  Resp:  [17-33] 31  SpO2:  [83 %-100 %] 95 %  BP: ()/(28-95) 128/62  Arterial Line BP: (80)/(61-62) 80/61   Weight: 63 kg (138 lb 14.2 oz)  There is no height or weight on file to calculate BMI.      Intake/Output Summary (Last 24 hours) at 11/13/2019 0610  Last data filed at 11/13/2019 0512  Gross per 24 hour   Intake 250 ml   Output 420 ml   Net -170 ml       Physical Exam   Constitutional:   Cold to touch   HENT:   Head: Atraumatic.   Eyes: Pupils are equal, round, and reactive to light. Right eye exhibits no discharge. Left eye exhibits no discharge.   Sluggishly reactive to light   Cardiovascular: Normal rate.   Pulmonary/Chest:   Intubated mechanically ventilated. Equal breath sounds bilaterally   Abdominal: He exhibits distension.   Musculoskeletal: He exhibits no deformity.   Neurological:   Sedated    Skin: There is pallor.   Nursing note and vitals reviewed.      Vents:  Vent Mode: A/C (11/13/19 0603)  Ventilator Initiated: Yes (11/13/19 0207)  Set Rate: 28 bmp (11/13/19 0603)  Vt Set: 450 mL (11/13/19 0603)  Pressure Support: 0 cmH20 (11/13/19 0603)  PEEP/CPAP: 5 cmH20 (11/13/19 0603)  Oxygen Concentration (%): 60 (11/13/19 0603)  Peak Airway Pressure: 29 cmH2O (11/13/19 0603)  Plateau Pressure: 0 cmH20 (11/13/19 0603)  Total Ve: 14.9 mL (11/13/19 0603)  F/VT Ratio<105 (RSBI): (!) 52.72 (11/13/19 0603)  Lines/Drains/Airways     Central Venous Catheter Line                 Percutaneous Central Line Insertion/Assessment - triple lumen  11/13/19 0119 less than 1 day          Drain                 NG/OG Tube 11/13/19 0125 orogastric 18 Fr. Center mouth less than 1 day         Urethral Catheter 11/13/19 0125 Temperature probe less than 1 day          Airway                  Airway - Non-Surgical 11/13/19 0119 less than 1 day          Arterial Line                 Arterial Line 11/13/19 0222 Right Radial less than 1 day          Peripheral Intravenous Line                 Peripheral IV - Single Lumen 11/13/19 0115 18 G Right Wrist less than 1 day         Peripheral IV - Single Lumen 11/13/19 0116 20 G Left Forearm less than 1 day              Significant Labs:    CBC/Anemia Profile:  Recent Labs   Lab 11/13/19 0114 11/13/19 0346   WBC 44.90* 42.46*   HGB 12.5* 12.4*   HCT 41.4 41.1   * 129*   * 107*   RDW 14.5 14.6*        Chemistries:  Recent Labs   Lab 11/13/19 0114 11/13/19 0346    133*   K 6.6* 6.6*   CL 97 95   CO2 8* 7*   BUN 69* 67*   CREATININE 3.2* 3.2*   CALCIUM 10.2 10.1   ALBUMIN 1.8* 1.8*   PROT 5.5* 5.7*   BILITOT 2.7* 3.0*   ALKPHOS 190* 205*   * 737*   AST 2,509* 3,641*   MG 3.7* 3.6*   PHOS  --  14.8*       ABGs:   Recent Labs   Lab 11/13/19 0143   PH 6.931*   PCO2 42.7   HCO3 9.0*   POCSATURATED 93*   BE -23     Lactic Acid:   Recent Labs   Lab 11/13/19 0114   LACTATE >12.0*       Significant Imaging: I have reviewed and interpreted all pertinent imaging results/findings within the past 24 hours.    Assessment/Plan:     Pulmonary  Respiratory failure  - likely hypoxemic resp failure 2/2 metastatic disease and malignant pleural effusions reported on CT  - Intubated and mechanically ventillated  Vent Mode: A/C  Oxygen Concentration (%):  [60] 60  Resp Rate Total:  [31 br/min-34 br/min] 32 br/min  Vt Set:  [450 mL] 450 mL  PEEP/CPAP:  [5 cmH20] 5 cmH20  Pressure Support:  [0 cmH20] 0 cmH20  Mean Airway Pressure:  [10 fbR28-49 cmH20] 10 cmH20   -ABGs    Renal/  Acute renal failure  - likely 2/2 ischemic ATN  -acidotic, Cr 3.2, low UOP  -nephro consulted for dialysis; appreciate recs. Poor HD candidate given shock on two pressors    Metabolic acidosis  - pH 6.9, lactate >12, uremia BUN 69  - likely in part due to his renal failure  and lactic acidosis  - on bicarb gtt  - will need dialysis- consult nephro; appreciate recs    ID  Sepsis  -unknown source; possibly due to SBP  -paracentesis and ascitic fluid analysis  -on broad spectrum abx with vanc and zosyn  - f/u blood cx    Hematology  Coagulopathy  - due to underlying liver disease vs DIC  - F/u hemolysis labs  - consider vit K for elevated INR      Oncology  Liver cancer  - advanced metastatic disease  - consult Oncology once medically stablizied; appreciate recs    Other  Shock  - septic vs obstructive shock from pulmonary hypertension/malignancy  - on broad spectrum abx with vanc, zosyn  - f/u blood cx; consider resp cx  - on levophed and vasopressin  - consider echo        Critical Care Daily Checklist:    A: Awake: RASS Goal/Actual Goal:    Actual:     B: Spontaneous Breathing Trial Performed?     C: SAT & SBT Coordinated?                     D: Delirium: CAM-ICU     E: Early Mobility Performed? Yes   F: Feeding Goal:    Status:     Current Diet Order   Procedures    Diet NPO      AS: Analgesia/Sedation Precedex   T: Thromboembolic Prophylaxis    H: HOB > 300 Yes   U: Stress Ulcer Prophylaxis (if needed) famotidine   G: Glucose Control    B: Bowel Function     I: Indwelling Catheter (Lines & [place]ll[place]) Necessity Y   D: De-escalation of Antimicrobials/Pharmacotherapies Vanc, zosyn    Plan for the day/ETD ICU care/ goals of care    Code Status:  Family/Goals of Care: Full Code  undecided       Critical secondary to Patient has a condition that poses threat to life and bodily function: Acute Renal Failure, shock     Critical care was time spent personally by me on the following activities: development of treatment plan with patient or surrogate and bedside caregivers, discussions with consultants, evaluation of patient's response to treatment, examination of patient, ordering and performing treatments and interventions, ordering and review of laboratory studies, ordering and review of  radiographic studies, pulse oximetry, re-evaluation of patient's condition. This critical care time did not overlap with that of any other provider or involve time for any procedures.     Mary Bustamante MD  Critical Care Medicine  [facility]

## 2019-11-13 NOTE — SUBJECTIVE & OBJECTIVE
No past medical history on file.    No past surgical history on file.    Review of patient's allergies indicates:  No Known Allergies    Family History     None        Tobacco Use    Smoking status: Not on file   Substance and Sexual Activity    Alcohol use: Not on file    Drug use: Not on file    Sexual activity: Not on file      Review of Systems   Unable to perform ROS: Intubated     Objective:     Vital Signs (Most Recent):  Temp: (!) 92.1 °F (33.4 °C) (11/13/19 0607)  Pulse: 109 (11/13/19 0607)  Resp: (!) 31 (11/13/19 0603)  BP: 128/62 (11/13/19 0603)  SpO2: 95 % (11/13/19 0603) Vital Signs (24h Range):  Temp:  [89.8 °F (32.1 °C)-92.1 °F (33.4 °C)] 92.1 °F (33.4 °C)  Pulse:  [] 109  Resp:  [17-33] 31  SpO2:  [83 %-100 %] 95 %  BP: ()/(28-95) 128/62  Arterial Line BP: (80)/(61-62) 80/61   Weight: 63 kg (138 lb 14.2 oz)  There is no height or weight on file to calculate BMI.      Intake/Output Summary (Last 24 hours) at 11/13/2019 0610  Last data filed at 11/13/2019 0512  Gross per 24 hour   Intake 250 ml   Output 420 ml   Net -170 ml       Physical Exam   Constitutional:   Cold to touch   HENT:   Head: Atraumatic.   Eyes: Pupils are equal, round, and reactive to light. Right eye exhibits no discharge. Left eye exhibits no discharge.   Sluggishly reactive to light   Cardiovascular: Normal rate.   Pulmonary/Chest:   Intubated mechanically ventilated. Equal breath sounds bilaterally   Abdominal: He exhibits distension.   Musculoskeletal: He exhibits no deformity.   Neurological:   Sedated    Skin: There is pallor.   Nursing note and vitals reviewed.      Vents:  Vent Mode: A/C (11/13/19 0603)  Ventilator Initiated: Yes (11/13/19 0207)  Set Rate: 28 bmp (11/13/19 0603)  Vt Set: 450 mL (11/13/19 0603)  Pressure Support: 0 cmH20 (11/13/19 0603)  PEEP/CPAP: 5 cmH20 (11/13/19 0603)  Oxygen Concentration (%): 60 (11/13/19 0603)  Peak Airway Pressure: 29 cmH2O (11/13/19 0603)  Plateau Pressure: 0 cmH20  (11/13/19 0603)  Total Ve: 14.9 mL (11/13/19 0603)  F/VT Ratio<105 (RSBI): (!) 52.72 (11/13/19 0603)  Lines/Drains/Airways     Central Venous Catheter Line                 Percutaneous Central Line Insertion/Assessment - triple lumen  11/13/19 0119 less than 1 day          Drain                 NG/OG Tube 11/13/19 0125 orogastric 18 Fr. Center mouth less than 1 day         Urethral Catheter 11/13/19 0125 Temperature probe less than 1 day          Airway                 Airway - Non-Surgical 11/13/19 0119 less than 1 day          Arterial Line                 Arterial Line 11/13/19 0222 Right Radial less than 1 day          Peripheral Intravenous Line                 Peripheral IV - Single Lumen 11/13/19 0115 18 G Right Wrist less than 1 day         Peripheral IV - Single Lumen 11/13/19 0116 20 G Left Forearm less than 1 day              Significant Labs:    CBC/Anemia Profile:  Recent Labs   Lab 11/13/19 0114 11/13/19 0346   WBC 44.90* 42.46*   HGB 12.5* 12.4*   HCT 41.4 41.1   * 129*   * 107*   RDW 14.5 14.6*        Chemistries:  Recent Labs   Lab 11/13/19 0114 11/13/19 0346    133*   K 6.6* 6.6*   CL 97 95   CO2 8* 7*   BUN 69* 67*   CREATININE 3.2* 3.2*   CALCIUM 10.2 10.1   ALBUMIN 1.8* 1.8*   PROT 5.5* 5.7*   BILITOT 2.7* 3.0*   ALKPHOS 190* 205*   * 737*   AST 2,509* 3,641*   MG 3.7* 3.6*   PHOS  --  14.8*       ABGs:   Recent Labs   Lab 11/13/19 0143   PH 6.931*   PCO2 42.7   HCO3 9.0*   POCSATURATED 93*   BE -23     Lactic Acid:   Recent Labs   Lab 11/13/19 0114   LACTATE >12.0*       Significant Imaging: I have reviewed and interpreted all pertinent imaging results/findings within the past 24 hours.

## 2019-11-13 NOTE — ASSESSMENT & PLAN NOTE
- pH 6.9, lactate >12, uremia BUN 69  - likely in part due to his renal failure and lactic acidosis  - on bicarb gtt  - will need dialysis- consult nephro; appreciate recs

## 2019-11-13 NOTE — ASSESSMENT & PLAN NOTE
- septic vs obstructive shock from pulmonary hypertension/malignancy  - on broad spectrum abx with vanc, zosyn  - f/u blood cx; consider resp cx  - on levophed and vasopressin  - consider echo

## 2019-11-13 NOTE — ASSESSMENT & PLAN NOTE
Palliative Care Encounter / Goals of care discussion:     Narrative:   Alvin Mujica is a 59 y.o. male patient with known HCC, Cirrhosis of the liver and history of Hep C that presents with abdominal pain and weakness. He developed rapidly progressing shock in an outside ED, likely septic, required emergent intubation and vasopressor support. Imaging suggests progression of his known HCC, now with bilateral pulmonary metastatic lesions, pleural effusions and ascites an no clear source of the pt. Septic shock.   He was transferred for higher level of care. He is currently critically ill on maximal level of support.    1: Symptoms:   - pain assesment: not appears to be in pain   - dyspnea assessment: appears tachypnic on vent   - anxiety assessment: not apparent   - depression assessment: unable to assess    2: Code Status:   - DNR    3: Psychosocial :   - Marital status: unclear  - Children: has a son who lives in [place]  - POA: not on file  - Sister lives in [place]    3: Medicolegal:    - Advance directive: not on file   - per Dr. Pollock the son is requesting maximal support until he can get to the hospital. He is on his way    4: Support System:  - Spiritual: unclear  - Family: son and sister, otherwise unknown     5: Goals of care Decisions / Recommendations / Plan:   Palliative care introduced:  [facility] in Disease and Illness trajectory  - unable to assess as pt. On the vent    Prognostication:  - very poor prognosis given advancing likely metastatic HCC. Pt. Appears to have non adherent to medical follow ups per reports and has not undergone any therapy for HCC.   - he has now developed what appears to be widely metastatic disease, likely malignant ascites and pleural effusions  - he has developed profound multi organ failure and septic shock and remains critically ill despite maximal supportive care with vent and two pressors.   - I agree with continuing maximal supportive care as is to allow family time  to arrive  - I also agree that ,should the pt. Die as a result of his underlying liver cancer and severe multi organ failure, CPR and resuscitation efforts are not beneficial care and I support DNR.     Goals of Care:  - will discuss once family arrives    Symptom Management:  - appears to have tachypnea and mild distress.   - once goals are discussed we can improve his comfort if so desired by family     Disposition:  -admitted to ICU    6: Follow up plans:    daily    Thank you for your consult. I will follow along with you. Please call [phone] with questions.

## 2019-11-13 NOTE — ASSESSMENT & PLAN NOTE
-unknown source; possibly due to SBP  -paracentesis and ascitic fluid analysis  -on broad spectrum abx with vanc and zosyn  - f/u blood cx

## 2019-11-14 NOTE — PLAN OF CARE
No acute events throughout day, VS and assessment per flow sheet, see below for updates:    Pulmonary: remains intubated on spontaneous mode with FiO2 70%; coarse BS throughout    Cardiovascular: SR 90s-100s; MAPs >65 with Levo gtt    Neurological: no W/D to pain, corneal/pupillary reflexes present; pt becoming more responsive throughout shift - no responding more to stimulation; afebrile    Gastrointestinal: OGT with brown output - hooked to suction; no BM this shift    Genitourinary: singh intact, but with little UOP; Cr continues to rise as well as K - shifted twice this shift    Endocrine: potassium shifted twice this shift; hyperglycemia treated with SSI    Skin/Bath: mottled on all extremities - more present on R foot; diaphoretic through most of shift      Date of last CHG bath given: 11/13/19    Infusions: Levo, Bicarb, and abx    Patient progressing towards goals as tolerated, plan of care communicated and reviewed with Alvin Mujica and family, all concerns addressed, will continue to monitor.     Fernanda Rocha RN    [facility] DAILY GOALS       A: Awake    RASS: Goal -    Actual - RASS (Pederson Agitation-Sedation Scale): 1-->restless   Restraint necessity:    B: Breath   SBT: Fail   C: Coordinate A & B, analgesics/sedatives   Pain: uncontrolled - giving Q2 hr PRN Fentanyl pushes   SAT: Fail - not arousing  D: Delirium   CAM-ICU: Overall CAM-ICU: Positive  E: Early Mobility   MOVE Screen: Fail   Activity: Activity Management: bedrest maintained per order  FAS: Feeding/Nutrition   Diet order: Diet/Nutrition Received: NPO,   Fluid restriction:    T: Thrombus   DVT prophylaxis: VTE Required Core Measure: Pharmacological prophylaxis initiated/maintained  H: HOB Elevation   Head of Bed (HOB): HOB at 30-45 degrees  U: Ulcer Prophylaxis   GI: yes  G: Glucose control   managed    S: Skin   Bundle compliance: yes   Bathing/Skin Care: bath, chlorhexidine, linen changed Date: 11/13/19  B: Bowel  Function   constipation - no known last BM  I: Indwelling Catheters   [place] necessity:      Urethral Catheter 11/13/19 0125 Temperature probe-Reason for Continuing Urinary Catheterization: Critically ill in ICU requiring intensive monitoring   CVC necessity: Yes   IPAD offered: Not appropriate  D: De-escalation Antibx   No  Plan for the day   Continue supportive care until withdrawal  Family/Goals of care/Code Status   Code Status: DNR     No acute events throughout day, VS and assessment per flow sheet, patient progressing towards goals as tolerated, plan of care reviewed with Alvin Mujica and family, all concerns addressed, will continue to monitor.

## 2019-11-14 NOTE — SIGNIFICANT EVENT
Death Note    Called to bedside by patient's nurse. Nursing supervisor notified. Family at bedside.  has been called and is also at bedside.    Patient is not responding to verbal or tactile stimuli. Patient does not have a papillary or corneal reflex. Pupils are fixed and dilated. No heart or breath sounds on auscultation. No respirations. No palpable pulses.     Time of death: 12:02 AM    Cause of Death: cardiac arrest    Mary Bustamante MD  Critical Care Medicine

## 2019-11-14 NOTE — PLAN OF CARE
Family arrived and at bedside.  Comfort care decided by family and  made available for support during transition period.  Patient passed 19, Time of death: 12:02 AM.       19 0928   Final Note   Assessment Type Final Discharge Note   Anticipated Discharge Disposition    Hospital Follow Up  Appt(s) scheduled? No   Discharge plans and expectations educations in teach back method with documentation complete? No   Right Care Referral Info   Post Acute Recommendation Other  (Comfort Measures)   Referral Type      Garrett Swartz RN MSN  Critical Care-   Ext. [phone]

## 2019-11-14 NOTE — PLAN OF CARE
"Mr. Mujica's sisters, brothers, and significant other all arrived and have been with him this evening. They understand that he is very ill and is not going to recover from this illness. They would like to proceed with withdrawal of life support and focus on his comfort. I called his son Alan who has been in contact with his other siblings, all of which are estranged from their father and deferred to Alan for decisions. Alan was with his older brother Lorenzo and they have been in contact with patients siblings. They understand that he is not getting better and very sick, they do not want to see him like this and asked that we "pull the plug" and do whatever his siblings wanted to do. I asked again if they were in agreement to stop medications and to remove the machine breathing for him and they both said that they do want to do that and just keep him comfortable.   We will plan to give him morphine for dyspnea as needed and ativan for agitation.     Dana Blue M.D.  [facility] Pulmonary/Critical Care Fellow    "

## 2019-11-14 NOTE — DISCHARGE SUMMARY
[facility]  Critical Care Medicine  Discharge Summary      Patient Name: Alvin Mujica  MRN: [id number]  Admission Date: 11/13/2019  Hospital Length of Stay: 1 days  Discharge Date and Time:  11/14/2019 12:31 AM  Attending Physician: Maryellen Pollock MD   Discharging Provider: Mary Bustamante MD  Primary Care Provider: No primary care provider on file.    HPI:   Alvin Mujica is a 59 y.o.male  transferred from [facility] presenting with septic shock. Patient has a history liver cancer with mets to lung, allegedly came to outside facility complaining of abdominal pain and shortness of breath. Patient transferred here after he was intubated, hypotensive.  Patient found to have leukocytosis in the 40s, renal failure, lactic acidosis with a pH of 6.9, and hyperkalemia.  He was given Rocephin, meropenem, and multiple drugs to shift his potassium,and started on bicarbonate infusion.He was sedated on Precedex, and was requiring norepinephrine and vasopresin infusions.  EMS states he continues to be tachycardic with stable blood pressures, 100% while intubated.Upon arrival pt was hypthothermic with Temp as low as 89F. Outside CT A/P notable for moderate size bilateral pleural effusions with innumerable bilateral pulmonary metastatic nodules. Moderate volume ascites. Cirrhosis with extensive periportal and abdominal retroperitoneal adenopathy and with known hepatocellular carcinoma.          * No surgery found *    Indwelling Lines/Drains at Time of Discharge:   Lines/Drains/Airways     Central Venous Catheter Line                 Percutaneous Central Line Insertion/Assessment - triple lumen  11/13/19 0119 less than 1 day          Drain                 NG/OG Tube 11/13/19 0125 orogastric 18 Fr. Center mouth less than 1 day         Urethral Catheter 11/13/19 0125 Temperature probe less than 1 day          Airway                 Airway - Non-Surgical 11/13/19 0119 less than 1 day           Arterial Line                 Arterial Line 11/13/19 0222 Right Radial less than 1 day              Hospital Course:   Mr. Mujica's sisters, brothers, and significant other all arrived and have been with him this evening. They understand that he is very ill and is not going to recover from this illness. They would like to proceed with withdrawal of life support and focus on his comfort. Given morphine for dyspnea as needed and ativan for agitation.  Shortly after withdrawal of care patient was not responding to verbal or tactile stimuli.Pupils were fixed and dilated. No heart or breath sounds on auscultation. No respirations. No palpable pulses. Time of death was called at 12:02AM 11/14.    Consults (From admission, onward)        Status Ordering Provider     Inpatient consult to Palliative Care  Once     Provider:  (Not yet assigned)    Completed MANOLO SYLVESTER     Inpatient consult to Registered Dietitian/Nutritionist  Once     Provider:  (Not yet assigned)    Acknowledged LACEY HERZOG     Pharmacy to dose Vancomycin consult  Once     Provider:  (Not yet assigned)    Acknowledged ACE GARCIA        Significant Labs:  All pertinent labs within the past 24 hours have been reviewed.    Significant Imaging:  I have reviewed all pertinent imaging results/findings within the past 24 hours.    Pending Diagnostic Studies:     None        Final Active Diagnoses:    Diagnosis Date Noted POA    PRINCIPAL PROBLEM:  Shock [R57.9] 11/13/2019 Unknown    Sepsis [A41.9] 11/13/2019 Yes    Metabolic acidosis [E87.2] 11/13/2019 Unknown    Respiratory failure [J96.90] 11/13/2019 Unknown    Coagulopathy [D68.9] 11/13/2019 Unknown    Acute renal failure [N17.9] 11/13/2019 Unknown    Liver cancer [C22.9] 11/13/2019 Unknown    Palliative care encounter [Z51.5] 11/13/2019 Not Applicable      Problems Resolved During this Admission:     No new Assessment & Plan notes have been filed under this hospital service  since the last note was generated.  Service: Critical Care Medicine    Discharged Condition:     Disposition:       Patient Instructions:   No discharge procedures on file.  Medications:  None (patient  at medical facility)     Mary Bustamante MD  Critical Care Medicine  [facility]

## 2019-11-14 NOTE — NURSING
1155: Withdraw of care started, Pt given morphine and ativan right before extubating as per ordered.     0002: Pt declared diseased by MD, strip printed and placed in chart.

## 2019-11-14 NOTE — HOSPITAL COURSE
Mr. Mujica's sisters, brothers, and significant other all arrived and have been with him this evening. They understand that he is very ill and is not going to recover from this illness. They would like to proceed with withdrawal of life support and focus on his comfort. Given morphine for dyspnea as needed and ativan for agitation. Shortly after withdrawal of care patient was not responding to verbal or tactile stimuli.Pupils were fixed and dilated. No heart or breath sounds on auscultation. No respirations. No palpable pulses. Time of death was called at 12:02AM 11/14.

## 2019-11-18 LAB
BACTERIA BLD CULT: NORMAL
BACTERIA BLD CULT: NORMAL